# Patient Record
Sex: MALE | Race: WHITE | Employment: FULL TIME | URBAN - METROPOLITAN AREA
[De-identification: names, ages, dates, MRNs, and addresses within clinical notes are randomized per-mention and may not be internally consistent; named-entity substitution may affect disease eponyms.]

---

## 2017-03-06 ENCOUNTER — OFFICE VISIT (OUTPATIENT)
Dept: FAMILY MEDICINE CLINIC | Facility: CLINIC | Age: 32
End: 2017-03-06

## 2017-03-06 ENCOUNTER — APPOINTMENT (OUTPATIENT)
Dept: LAB | Age: 32
End: 2017-03-06
Attending: FAMILY MEDICINE
Payer: COMMERCIAL

## 2017-03-06 VITALS
HEART RATE: 84 BPM | BODY MASS INDEX: 41.22 KG/M2 | RESPIRATION RATE: 18 BRPM | WEIGHT: 272 LBS | TEMPERATURE: 98 F | HEIGHT: 68 IN | SYSTOLIC BLOOD PRESSURE: 127 MMHG | DIASTOLIC BLOOD PRESSURE: 82 MMHG

## 2017-03-06 DIAGNOSIS — Z00.00 ROUTINE PHYSICAL EXAMINATION: ICD-10-CM

## 2017-03-06 LAB
ALBUMIN SERPL BCP-MCNC: 4 G/DL (ref 3.5–4.8)
ALBUMIN/GLOB SERPL: 1.2 {RATIO} (ref 1–2)
ALP SERPL-CCNC: 73 U/L (ref 32–100)
ALT SERPL-CCNC: 80 U/L (ref 17–63)
ANION GAP SERPL CALC-SCNC: 9 MMOL/L (ref 0–18)
AST SERPL-CCNC: 53 U/L (ref 15–41)
BILIRUB SERPL-MCNC: 0.7 MG/DL (ref 0.3–1.2)
BUN SERPL-MCNC: 9 MG/DL (ref 8–20)
BUN/CREAT SERPL: 10.6 (ref 10–20)
CALCIUM SERPL-MCNC: 9.2 MG/DL (ref 8.5–10.5)
CHLORIDE SERPL-SCNC: 104 MMOL/L (ref 95–110)
CHOLEST SERPL-MCNC: 155 MG/DL (ref 110–200)
CO2 SERPL-SCNC: 27 MMOL/L (ref 22–32)
CREAT SERPL-MCNC: 0.85 MG/DL (ref 0.5–1.5)
ERYTHROCYTE [DISTWIDTH] IN BLOOD BY AUTOMATED COUNT: 16.3 % (ref 11–15)
GLOBULIN PLAS-MCNC: 3.3 G/DL (ref 2.5–3.7)
GLUCOSE SERPL-MCNC: 74 MG/DL (ref 70–99)
HCT VFR BLD AUTO: 42.5 % (ref 41–52)
HDLC SERPL-MCNC: 30 MG/DL
HGB BLD-MCNC: 13.5 G/DL (ref 13.5–17.5)
LDLC SERPL CALC-MCNC: 68 MG/DL (ref 0–99)
MCH RBC QN AUTO: 19.2 PG (ref 27–32)
MCHC RBC AUTO-ENTMCNC: 31.8 G/DL (ref 32–37)
MCV RBC AUTO: 60.3 FL (ref 80–100)
NONHDLC SERPL-MCNC: 125 MG/DL
OSMOLALITY UR CALC.SUM OF ELEC: 287 MOSM/KG (ref 275–295)
PLATELET # BLD AUTO: 243 K/UL (ref 140–400)
PMV BLD AUTO: 9.1 FL (ref 7.4–10.3)
POTASSIUM SERPL-SCNC: 3.8 MMOL/L (ref 3.3–5.1)
PROT SERPL-MCNC: 7.3 G/DL (ref 5.9–8.4)
RBC # BLD AUTO: 7.04 M/UL (ref 4.5–5.9)
SODIUM SERPL-SCNC: 140 MMOL/L (ref 136–144)
TRIGL SERPL-MCNC: 283 MG/DL (ref 1–149)
TSH SERPL-ACNC: 4.38 UIU/ML (ref 0.34–5.6)
WBC # BLD AUTO: 6.1 K/UL (ref 4–11)

## 2017-03-06 PROCEDURE — 80061 LIPID PANEL: CPT

## 2017-03-06 PROCEDURE — 36415 COLL VENOUS BLD VENIPUNCTURE: CPT

## 2017-03-06 PROCEDURE — 84402 ASSAY OF FREE TESTOSTERONE: CPT

## 2017-03-06 PROCEDURE — 99395 PREV VISIT EST AGE 18-39: CPT | Performed by: FAMILY MEDICINE

## 2017-03-06 PROCEDURE — 80053 COMPREHEN METABOLIC PANEL: CPT

## 2017-03-06 PROCEDURE — 85027 COMPLETE CBC AUTOMATED: CPT

## 2017-03-06 PROCEDURE — 84443 ASSAY THYROID STIM HORMONE: CPT

## 2017-03-06 PROCEDURE — 84403 ASSAY OF TOTAL TESTOSTERONE: CPT

## 2017-03-06 NOTE — PROGRESS NOTES
HPI:    Patient ID: Charli Gongora is a 27year old male. HPI Comments: Patient is here for routine physical exam. No acute issues. No significant chronic medical problems. Patient is requesting blood testing. Diet and exercise have been fair.  Past and affect. His behavior is normal. Judgment and thought content normal.   Vitals reviewed. ASSESSMENT/PLAN:   Routine physical examination with some ED issues:  - Exam is unremarkable.  Screening tests were discussed, and after discussion, will

## 2017-03-08 ENCOUNTER — TELEPHONE (OUTPATIENT)
Dept: FAMILY MEDICINE CLINIC | Facility: CLINIC | Age: 32
End: 2017-03-08

## 2017-03-08 LAB
TESTOSTERONE, FREE, S: 8.75 NG/DL
TESTOSTERONE, TOTAL, S: 265 NG/DL

## 2017-03-08 RX ORDER — SILDENAFIL 50 MG/1
50 TABLET, FILM COATED ORAL
Qty: 10 TABLET | Refills: 2 | Status: SHIPPED | OUTPATIENT
Start: 2017-03-08 | End: 2022-02-01

## 2017-03-09 ENCOUNTER — TELEPHONE (OUTPATIENT)
Dept: FAMILY MEDICINE CLINIC | Facility: CLINIC | Age: 32
End: 2017-03-09

## 2017-03-10 NOTE — TELEPHONE ENCOUNTER
PA for Viagra 50 mg tab completed with [employer] via CMM response time 3-5 business days [facility].

## 2017-03-16 NOTE — TELEPHONE ENCOUNTER
Viagra 50 mg tab #8/30 days approved effective 3/12/2017-3/12/2018 case# [id number]. Patient notified via [address]

## 2017-11-14 ENCOUNTER — HOSPITAL ENCOUNTER (EMERGENCY)
Facility: HOSPITAL | Age: 32
Discharge: HOME | End: 2017-11-15
Attending: EMERGENCY MEDICINE
Payer: COMMERCIAL

## 2017-11-14 DIAGNOSIS — S91.112A LACERATION OF LEFT GREAT TOE WITHOUT FOREIGN BODY PRESENT OR DAMAGE TO NAIL, INITIAL ENCOUNTER: Primary | ICD-10-CM

## 2017-11-14 PROCEDURE — 99283 EMERGENCY DEPT VISIT LOW MDM: CPT

## 2017-11-14 PROCEDURE — 12001 RPR S/N/AX/GEN/TRNK 2.5CM/<: CPT

## 2017-11-15 ENCOUNTER — APPOINTMENT (OUTPATIENT)
Dept: GENERAL RADIOLOGY | Facility: HOSPITAL | Age: 32
End: 2017-11-15
Attending: EMERGENCY MEDICINE
Payer: COMMERCIAL

## 2017-11-15 VITALS
HEART RATE: 111 BPM | WEIGHT: 250 LBS | HEIGHT: 68 IN | TEMPERATURE: 99 F | BODY MASS INDEX: 37.89 KG/M2 | RESPIRATION RATE: 16 BRPM | SYSTOLIC BLOOD PRESSURE: 164 MMHG | DIASTOLIC BLOOD PRESSURE: 85 MMHG | OXYGEN SATURATION: 97 %

## 2017-11-15 PROCEDURE — 73660 X-RAY EXAM OF TOE(S): CPT | Performed by: EMERGENCY MEDICINE

## 2017-11-15 NOTE — ED PROVIDER NOTES
Patient Seen in: [facility] AND [facility] Emergency Department    History   Patient presents with:  Laceration Abrasion (integumentary)    Stated Complaint: cut on toe    HPI    Patient presents to the emergency room complaining of a laceration to his left grea great toe  Laceration length (cm):  2cm  Foreign bodies:  None  Tendon involvement:  None  Nerve involvement:  None  Vascular damage?   None    Anesthesia:   Local anesthetic:  Lidocaine 1%    Procedure:  Patient was prepped and draped in usual sterile fash

## 2017-11-15 NOTE — ED NOTES
Patient resting in bed, call light at reach. Patient complains of left great toe pain 3/10, declines pain medication at this time.

## 2017-11-21 ENCOUNTER — OFFICE VISIT (OUTPATIENT)
Dept: FAMILY MEDICINE CLINIC | Facility: CLINIC | Age: 32
End: 2017-11-21

## 2017-11-21 VITALS
RESPIRATION RATE: 20 BRPM | SYSTOLIC BLOOD PRESSURE: 129 MMHG | HEART RATE: 92 BPM | TEMPERATURE: 98 F | WEIGHT: 243 LBS | DIASTOLIC BLOOD PRESSURE: 85 MMHG | BODY MASS INDEX: 38.14 KG/M2 | HEIGHT: 67 IN

## 2017-11-21 DIAGNOSIS — S91.112D LACERATION OF LEFT GREAT TOE WITHOUT FOREIGN BODY PRESENT OR DAMAGE TO NAIL, SUBSEQUENT ENCOUNTER: ICD-10-CM

## 2017-11-21 PROCEDURE — 99213 OFFICE O/P EST LOW 20 MIN: CPT | Performed by: FAMILY MEDICINE

## 2017-11-21 PROCEDURE — 99212 OFFICE O/P EST SF 10 MIN: CPT | Performed by: FAMILY MEDICINE

## 2017-11-21 NOTE — PROGRESS NOTES
HPI:    Patient ID: Ellen Qureshi is a 34year old male. Pt presents for follow up from the ER for toe laceration and repair of left big toe. Pt had injury while playing floor hockey. Patient is here for suture removal. Patient states no sig pains. above.       Physical Exam  ED Triage Vitals (11/14/17 2312)  BP: 140/74  Pulse: 123  Resp: 16  Temp: 98.7 °F (37.1 °C)  Temp src: n/a  SpO2: 98 %  O2 Device: n/a     Current:BP (!) 164/85   Pulse 111   Temp 98.7 °F (37.1 °C)   Resp 16   Ht 172.7 cm (5' 8\ Physical Exam   Constitutional: He appears well-developed and well-nourished.    Musculoskeletal:   Left big toe: wound healing well:    3 Sutures removed without problems; area bandaged and treated with neosporin               ASSESSMENT/PLAN:   Casey Alejandro

## 2018-11-14 ENCOUNTER — OFFICE VISIT (OUTPATIENT)
Dept: FAMILY MEDICINE CLINIC | Facility: CLINIC | Age: 33
End: 2018-11-14
Payer: COMMERCIAL

## 2018-11-14 VITALS
RESPIRATION RATE: 20 BRPM | SYSTOLIC BLOOD PRESSURE: 136 MMHG | HEART RATE: 97 BPM | HEIGHT: 67 IN | TEMPERATURE: 99 F | BODY MASS INDEX: 38.77 KG/M2 | WEIGHT: 247 LBS | DIASTOLIC BLOOD PRESSURE: 85 MMHG

## 2018-11-14 DIAGNOSIS — S89.91XA INJURY OF RIGHT KNEE, INITIAL ENCOUNTER: Primary | ICD-10-CM

## 2018-11-14 PROCEDURE — 99212 OFFICE O/P EST SF 10 MIN: CPT | Performed by: FAMILY MEDICINE

## 2018-11-14 PROCEDURE — 99213 OFFICE O/P EST LOW 20 MIN: CPT | Performed by: FAMILY MEDICINE

## 2018-11-14 NOTE — PROGRESS NOTES
HPI:    Patient ID: Gavin Perez is a 57 Ger[address]ety[address] old male. Pt presents with pain and injury to the right knee after playing floor hockey. Pt stepped awkwardly and knee buckled. Pt has had some swelling of knee.  Pt can bear weight but does have some d

## 2018-12-05 NOTE — ED NOTES
Telephone Encounter by Anette Arnett at 10/19/17 11:18 AM     Author:  Anette Arnett Service:  (none) Author Type:  Technician     Filed:  10/19/17 11:19 AM Encounter Date:  10/10/2017 Status:  Signed     :  Anette Arnett (Technician)            Patient was contacted and appointment for tomorrow 10/20 @ 9 am was scheduled  [place] Salt was contacted and will be here for the appointment[LR1.1M]      Revision History        User Key Date/Time User Provider Type Action    > LR1.1 10/19/17 11:19 AM Anette Arnett Technician Sign    M - Manual Wound dressing and boot applied to patient by ER tech per MD order.

## 2022-02-01 ENCOUNTER — TELEPHONE (OUTPATIENT)
Dept: FAMILY MEDICINE CLINIC | Facility: CLINIC | Age: 37
End: 2022-02-01

## 2022-02-01 ENCOUNTER — OFFICE VISIT (OUTPATIENT)
Dept: FAMILY MEDICINE CLINIC | Facility: CLINIC | Age: 37
End: 2022-02-01
Payer: COMMERCIAL

## 2022-02-01 VITALS
RESPIRATION RATE: 20 BRPM | DIASTOLIC BLOOD PRESSURE: 92 MMHG | TEMPERATURE: 98 F | SYSTOLIC BLOOD PRESSURE: 148 MMHG | BODY MASS INDEX: 37.2 KG/M2 | WEIGHT: 237 LBS | HEART RATE: 96 BPM | HEIGHT: 67 IN

## 2022-02-01 DIAGNOSIS — N52.9 ERECTILE DYSFUNCTION, UNSPECIFIED ERECTILE DYSFUNCTION TYPE: ICD-10-CM

## 2022-02-01 DIAGNOSIS — R07.89 RIGHT-SIDED CHEST WALL PAIN: Primary | ICD-10-CM

## 2022-02-01 PROCEDURE — 3080F DIAST BP >= 90 MM HG: CPT | Performed by: FAMILY MEDICINE

## 2022-02-01 PROCEDURE — 99213 OFFICE O/P EST LOW 20 MIN: CPT | Performed by: FAMILY MEDICINE

## 2022-02-01 PROCEDURE — 3008F BODY MASS INDEX DOCD: CPT | Performed by: FAMILY MEDICINE

## 2022-02-01 PROCEDURE — 3077F SYST BP >= 140 MM HG: CPT | Performed by: FAMILY MEDICINE

## 2022-02-01 RX ORDER — SILDENAFIL 50 MG/1
50 TABLET, FILM COATED ORAL
Qty: 10 TABLET | Refills: 5 | Status: SHIPPED | OUTPATIENT
Start: 2022-02-01 | End: 2022-03-03

## 2022-02-02 NOTE — TELEPHONE ENCOUNTER
Prior authorization form has been filled out for sildenafil and faxed to [employer] to [phone] and [phone].  It can take 1-5 business days for a decision to come back none

## 2022-02-02 NOTE — TELEPHONE ENCOUNTER
Message noted. Can inform patient and can pay out of pocket or make follow up to discuss alternative treatment.

## 2022-02-03 NOTE — TELEPHONE ENCOUNTER
Called patient and relayed message regarding medication denial. Patient states that he will schedule appointment through [facility] to discuss alternate treatment.

## 2022-05-24 ENCOUNTER — LAB ENCOUNTER (OUTPATIENT)
Dept: LAB | Age: 37
End: 2022-05-24
Attending: FAMILY MEDICINE
Payer: COMMERCIAL

## 2022-05-24 ENCOUNTER — OFFICE VISIT (OUTPATIENT)
Dept: FAMILY MEDICINE CLINIC | Facility: CLINIC | Age: 37
End: 2022-05-24
Payer: COMMERCIAL

## 2022-05-24 VITALS
TEMPERATURE: 98 F | WEIGHT: 233 LBS | RESPIRATION RATE: 16 BRPM | BODY MASS INDEX: 36.57 KG/M2 | DIASTOLIC BLOOD PRESSURE: 97 MMHG | HEIGHT: 67 IN | HEART RATE: 103 BPM | SYSTOLIC BLOOD PRESSURE: 152 MMHG

## 2022-05-24 DIAGNOSIS — Z00.00 ROUTINE PHYSICAL EXAMINATION: Primary | ICD-10-CM

## 2022-05-24 DIAGNOSIS — Z00.00 ROUTINE PHYSICAL EXAMINATION: ICD-10-CM

## 2022-05-24 DIAGNOSIS — G47.30 SLEEP APNEA, UNSPECIFIED TYPE: ICD-10-CM

## 2022-05-24 DIAGNOSIS — N52.9 ERECTILE DYSFUNCTION, UNSPECIFIED ERECTILE DYSFUNCTION TYPE: ICD-10-CM

## 2022-05-24 DIAGNOSIS — F17.200 SMOKING: ICD-10-CM

## 2022-05-24 LAB
ALBUMIN SERPL-MCNC: 3.8 G/DL (ref 3.4–5)
ALBUMIN/GLOB SERPL: 0.9 {RATIO} (ref 1–2)
ALP LIVER SERPL-CCNC: 85 U/L
ALT SERPL-CCNC: 73 U/L
ANION GAP SERPL CALC-SCNC: 4 MMOL/L (ref 0–18)
AST SERPL-CCNC: 65 U/L (ref 15–37)
BILIRUB SERPL-MCNC: 1.3 MG/DL (ref 0.1–2)
BUN BLD-MCNC: 9 MG/DL (ref 7–18)
BUN/CREAT SERPL: 11.7 (ref 10–20)
CALCIUM BLD-MCNC: 9.7 MG/DL (ref 8.5–10.1)
CHLORIDE SERPL-SCNC: 105 MMOL/L (ref 98–112)
CHOLEST SERPL-MCNC: 129 MG/DL (ref ?–200)
CO2 SERPL-SCNC: 29 MMOL/L (ref 21–32)
CREAT BLD-MCNC: 0.77 MG/DL
DEPRECATED RDW RBC AUTO: 38.2 FL (ref 35.1–46.3)
ERYTHROCYTE [DISTWIDTH] IN BLOOD BY AUTOMATED COUNT: 18.6 % (ref 11–15)
FASTING PATIENT LIPID ANSWER: YES
FASTING STATUS PATIENT QL REPORTED: YES
GLOBULIN PLAS-MCNC: 4.4 G/DL (ref 2.8–4.4)
GLUCOSE BLD-MCNC: 127 MG/DL (ref 70–99)
HCT VFR BLD AUTO: 45.2 %
HDLC SERPL-MCNC: 28 MG/DL (ref 40–59)
HGB BLD-MCNC: 14.2 G/DL
LDLC SERPL CALC-MCNC: 72 MG/DL (ref ?–100)
MCH RBC QN AUTO: 20.8 PG (ref 26–34)
MCHC RBC AUTO-ENTMCNC: 31.4 G/DL (ref 31–37)
MCV RBC AUTO: 66.3 FL
NONHDLC SERPL-MCNC: 101 MG/DL (ref ?–130)
OSMOLALITY SERPL CALC.SUM OF ELEC: 286 MOSM/KG (ref 275–295)
PLATELET # BLD AUTO: 210 10(3)UL (ref 150–450)
POTASSIUM SERPL-SCNC: 4.1 MMOL/L (ref 3.5–5.1)
PROT SERPL-MCNC: 8.2 G/DL (ref 6.4–8.2)
RBC # BLD AUTO: 6.82 X10(6)UL
SODIUM SERPL-SCNC: 138 MMOL/L (ref 136–145)
TRIGL SERPL-MCNC: 171 MG/DL (ref 30–149)
VLDLC SERPL CALC-MCNC: 26 MG/DL (ref 0–30)
WBC # BLD AUTO: 7.9 X10(3) UL (ref 4–11)

## 2022-05-24 PROCEDURE — 3077F SYST BP >= 140 MM HG: CPT | Performed by: FAMILY MEDICINE

## 2022-05-24 PROCEDURE — 36415 COLL VENOUS BLD VENIPUNCTURE: CPT

## 2022-05-24 PROCEDURE — 80053 COMPREHEN METABOLIC PANEL: CPT

## 2022-05-24 PROCEDURE — 85060 BLOOD SMEAR INTERPRETATION: CPT

## 2022-05-24 PROCEDURE — 99395 PREV VISIT EST AGE 18-39: CPT | Performed by: FAMILY MEDICINE

## 2022-05-24 PROCEDURE — 3080F DIAST BP >= 90 MM HG: CPT | Performed by: FAMILY MEDICINE

## 2022-05-24 PROCEDURE — 80061 LIPID PANEL: CPT

## 2022-05-24 PROCEDURE — 85027 COMPLETE CBC AUTOMATED: CPT

## 2022-05-24 PROCEDURE — 3008F BODY MASS INDEX DOCD: CPT | Performed by: FAMILY MEDICINE

## 2022-05-24 NOTE — PROGRESS NOTES
Subjective:   Patient ID: Di Reaves is a 39year old male. Patient is here for routine physical exam. No acute issues. No significant chronic medical problems. Patient is requesting blood testing. Diet and exercise have been fair. Past medical history, family history, and social history were reviewed. Pt has hx of ED and was told that insurance no longer covers viagra. Pt also has had hx of smoking and had some right lower chest discomfort with cough. This resolved and did not do x-ray at time but symptoms have recurred. Pt also would like to have evaluation of sleep apnea as he has hx of apnea and snoring. History/Other:   Review of Systems   Constitutional: Negative. Negative for fever. HENT: Negative. Eyes: Negative. Respiratory: Positive for apnea and cough. Negative for shortness of breath. Cardiovascular: Negative. Negative for chest pain and palpitations. Gastrointestinal: Negative. Negative for abdominal pain, blood in stool and constipation. Genitourinary: Negative. Negative for difficulty urinating and dysuria. Musculoskeletal: Negative. Skin: Negative. Neurological: Negative. Negative for dizziness and headaches. Psychiatric/Behavioral: Negative. Negative for dysphoric mood. The patient is not nervous/anxious. No current outpatient medications on file. Allergies:No Known Allergies    Objective:   Physical Exam  Constitutional:       Appearance: Normal appearance. He is well-developed. HENT:      Head: Normocephalic. Right Ear: Tympanic membrane, ear canal and external ear normal.      Left Ear: Tympanic membrane, ear canal and external ear normal.      Nose: Nose normal.      Mouth/Throat:      Mouth: Mucous membranes are moist.      Pharynx: No oropharyngeal exudate or posterior oropharyngeal erythema. Eyes:      Conjunctiva/sclera: Conjunctivae normal.   Cardiovascular:      Rate and Rhythm: Normal rate and regular rhythm. Pulses: Normal pulses. Heart sounds: Normal heart sounds. Pulmonary:      Effort: Pulmonary effort is normal. No respiratory distress. Breath sounds: Normal breath sounds. No wheezing or rales. Abdominal:      General: Abdomen is flat. There is no distension. Palpations: Abdomen is soft. Tenderness: There is no abdominal tenderness. Musculoskeletal:         General: Normal range of motion. Cervical back: Normal range of motion and neck supple. Skin:     General: Skin is warm. Neurological:      General: No focal deficit present. Mental Status: He is alert and oriented to person, place, and time. Sensory: No sensory deficit. Deep Tendon Reflexes: Reflexes are normal and symmetric. Reflexes normal.   Psychiatric:         Mood and Affect: Mood normal.         Behavior: Behavior normal.         Assessment & Plan:   Routine physical examination:  - Exam is unremarkable. Screening tests were discussed, and after discussion, will check lab work as below. Healthy diet, exercise, and weight were discussed. To call if problems and follow up and further management after testing. Routine follow up. Erectile dysfunction, unspecified erectile dysfunction type:  - Stable: medication reviewed and  Pt will check with pharmacy to see if any ED medications are covered; To call if problems; Routine follow up. Sleep apnea, unspecified type:  - After discussion, will check sleep study: Follow up and further management after testing with sleep specialist.    Smoking: cough  - After discussion with patient, will check chest xray. Follow up and further management after testing. Smoking cessation encouraged. Orders Placed This Encounter      Lipid Panel      Comp Metabolic Panel (14)      CBC, Platelet;  No Differential      Meds This Visit:  Requested Prescriptions      No prescriptions requested or ordered in this encounter       Imaging & Referrals:   [facility] REFERRAL HOME SLEEP APNEA TEST  XR CHEST PA + LAT CHEST (CPT=71046)

## 2023-08-19 ENCOUNTER — HOSPITAL ENCOUNTER (OUTPATIENT)
Age: 38
Discharge: HOME | End: 2023-08-19
Payer: COMMERCIAL

## 2023-08-19 ENCOUNTER — APPOINTMENT (OUTPATIENT)
Dept: GENERAL RADIOLOGY | Age: 38
End: 2023-08-19
Attending: NURSE PRACTITIONER
Payer: COMMERCIAL

## 2023-08-19 VITALS
SYSTOLIC BLOOD PRESSURE: 160 MMHG | HEART RATE: 103 BPM | TEMPERATURE: 98 F | RESPIRATION RATE: 18 BRPM | DIASTOLIC BLOOD PRESSURE: 90 MMHG | OXYGEN SATURATION: 96 %

## 2023-08-19 DIAGNOSIS — M25.472 PAIN AND SWELLING OF ANKLE, LEFT: Primary | ICD-10-CM

## 2023-08-19 DIAGNOSIS — M25.572 PAIN AND SWELLING OF ANKLE, LEFT: Primary | ICD-10-CM

## 2023-08-19 PROCEDURE — 73610 X-RAY EXAM OF ANKLE: CPT | Performed by: NURSE PRACTITIONER

## 2023-08-19 PROCEDURE — 99213 OFFICE O/P EST LOW 20 MIN: CPT | Performed by: NURSE PRACTITIONER

## 2023-08-19 RX ORDER — PREDNISONE 20 MG/1
40 TABLET ORAL DAILY
Qty: 10 TABLET | Refills: 0 | Status: SHIPPED | OUTPATIENT
Start: 2023-08-19 | End: 2023-08-24

## 2023-08-19 RX ORDER — CEFADROXIL 500 MG/1
500 CAPSULE ORAL 2 TIMES DAILY
Qty: 20 CAPSULE | Refills: 0 | Status: SHIPPED | OUTPATIENT
Start: 2023-08-19 | End: 2023-08-29

## 2023-08-19 NOTE — DISCHARGE INSTRUCTIONS
Follow-up with your primary care provider in 2 days for a skin recheck. Start the prednisone and the antibiotic as prescribed take a probiotic or eat yogurt as some antibiotics cause upset stomach and diarrhea. You were provided with education for cellulitis as there is concern for cellulitis but also concern for gout. Take over-the-counter ibuprofen as needed for pain you may apply a cool compress to the area inside of a pillowcase or cloth do not apply ice directly to the skin. If you develop increase in swelling worsening pain redness is moving up the leg toward the calf or you develop any calf pain swelling redness or warmth or inability to ambulate go to the nearest emergency department.

## 2024-03-20 ENCOUNTER — OFFICE VISIT (OUTPATIENT)
Dept: FAMILY MEDICINE CLINIC | Facility: CLINIC | Age: 39
End: 2024-03-20
Payer: COMMERCIAL

## 2024-03-20 VITALS
DIASTOLIC BLOOD PRESSURE: 86 MMHG | HEIGHT: 66.6 IN | HEART RATE: 109 BPM | SYSTOLIC BLOOD PRESSURE: 135 MMHG | TEMPERATURE: 98 F | RESPIRATION RATE: 20 BRPM | BODY MASS INDEX: 38.59 KG/M2 | WEIGHT: 243 LBS

## 2024-03-20 DIAGNOSIS — R21 RASH AND NONSPECIFIC SKIN ERUPTION: Primary | ICD-10-CM

## 2024-03-20 DIAGNOSIS — R22.2 LUMP IN CHEST: ICD-10-CM

## 2024-03-20 DIAGNOSIS — G47.30 SLEEP DISORDER BREATHING: ICD-10-CM

## 2024-03-20 PROCEDURE — 3079F DIAST BP 80-89 MM HG: CPT | Performed by: FAMILY MEDICINE

## 2024-03-20 PROCEDURE — 3008F BODY MASS INDEX DOCD: CPT | Performed by: FAMILY MEDICINE

## 2024-03-20 PROCEDURE — 3075F SYST BP GE 130 - 139MM HG: CPT | Performed by: FAMILY MEDICINE

## 2024-03-20 PROCEDURE — 99214 OFFICE O/P EST MOD 30 MIN: CPT | Performed by: FAMILY MEDICINE

## 2025-05-09 ENCOUNTER — OFFICE VISIT (OUTPATIENT)
Dept: FAMILY MEDICINE CLINIC | Facility: CLINIC | Age: 40
End: 2025-05-09
Payer: COMMERCIAL

## 2025-05-09 VITALS
DIASTOLIC BLOOD PRESSURE: 84 MMHG | WEIGHT: 250 LBS | BODY MASS INDEX: 39.7 KG/M2 | HEART RATE: 100 BPM | TEMPERATURE: 99 F | SYSTOLIC BLOOD PRESSURE: 160 MMHG | RESPIRATION RATE: 18 BRPM | OXYGEN SATURATION: 96 % | HEIGHT: 66.6 IN

## 2025-05-09 DIAGNOSIS — L03.115 CELLULITIS OF RIGHT LOWER EXTREMITY: Primary | ICD-10-CM

## 2025-05-09 DIAGNOSIS — R03.0 ELEVATED BLOOD PRESSURE READING IN OFFICE WITHOUT DIAGNOSIS OF HYPERTENSION: ICD-10-CM

## 2025-05-09 PROCEDURE — 3008F BODY MASS INDEX DOCD: CPT | Performed by: NURSE PRACTITIONER

## 2025-05-09 PROCEDURE — 3079F DIAST BP 80-89 MM HG: CPT | Performed by: NURSE PRACTITIONER

## 2025-05-09 PROCEDURE — 3077F SYST BP >= 140 MM HG: CPT | Performed by: NURSE PRACTITIONER

## 2025-05-09 PROCEDURE — 99213 OFFICE O/P EST LOW 20 MIN: CPT | Performed by: NURSE PRACTITIONER

## 2025-05-09 RX ORDER — CEFADROXIL 500 MG/1
500 CAPSULE ORAL 2 TIMES DAILY
Qty: 14 CAPSULE | Refills: 0 | Status: SHIPPED | OUTPATIENT
Start: 2025-05-09 | End: 2025-05-16

## 2025-05-09 RX ORDER — MUPIROCIN 20 MG/G
OINTMENT TOPICAL
Qty: 22 G | Refills: 0 | Status: SHIPPED | OUTPATIENT
Start: 2025-05-09

## 2025-05-09 NOTE — PROGRESS NOTES
CHIEF COMPLAINT:     Chief Complaint   Patient presents with    Leg or Foot Injury     Sx Sunday - Noticed a scrape on R shin  Sx Tues/Wed - Redness, slight swelling around scrape, warmth around the area  Denies numbness/tingling, difficulty bearing weight, known injury, discharge, itchiness  OTC Neosporin       HPI:     Rodolfo Inman is a 39 year old male who presents with concerns of possible skin infection to right leg.  Onset 2-3 days ago.  He has no idea how the scrape occurred but notes it is increasing in redness and warmth.  No drainage.  No calf pain or edema.  Denies fever or systemic symptoms.  Ambulating normally, denies numbness or tingling.  Has applied otc neosporin.  Denies known DM but sugars borderline high at last physical a couple years ago.  Prior Hx of poor wound healing or MRSA: Denies    Current Medications[1]   Past Medical History[2]   Social History:  Short Social Hx on File[3]     REVIEW OF SYSTEMS:   GENERAL: feels well otherwise, no fever, no chills.  SKIN: as above.  CHEST: no chest pains, no palpitations.  LUNGS: denies shortness of breath with exertion or rest. No wheezing, no cough.  LYMPH: No enlargement of the lymph nodes.  MUSC/SKEL: no joint swelling, no joint stiffness.  CARDIOVASCULAR: denies chest pain, dyspnea, or SOB  GI: no nausea, no vomiting or abdominal pain  NEURO: no abnormal sensation, no tingling of the skin or numbness.    EXAM:   /84   Pulse 100   Temp 99.1 °F (37.3 °C) (Tympanic)   Resp 18   Ht 5' 6.6\" (1.692 m)   Wt 250 lb (113.4 kg)   SpO2 96%   BMI 39.63 kg/m²   GENERAL: Well-appearing, well developed, well nourished, and in no apparent distress  SKIN: See wound in picture.  Extending erythema is tender, warm.  No streaking from wound, no discharge expresses.  LUNGS: clear to auscultation bilaterally, no wheezes or rhonchi. Breathing is non labored.  CARDIO: RRR without murmur  EXTREMITIES: no cyanosis, clubbing or edema.  Cap refill brisk-  less than 2 seconds.   LYMPH: No lymphadenopathy.                ASSESSMENT AND PLAN:     ASSESSMENT:   Encounter Diagnoses   Name Primary?    Cellulitis of right lower extremity Yes    Elevated blood pressure reading in office without diagnosis of hypertension        PLAN:   - Medication as below.  - Cleanse gently with mild soapy water or wound spray.    - Skin care discussed with patient.   - Instructions and Comfort Care as listed in Patient Instructions.    - Advised BP elevated.  Schedule with PCP for re-check within 2 weeks, also overdue for labs/physical.  - The patient was advised to follow up within 2-3 days if no improvement/worsening.  Advised ER if ever new systemic symptoms/fever or streaking from wound.  - The patient indicates understanding of these issues and agrees to the plan.    Requested Prescriptions     Signed Prescriptions Disp Refills    cefadroxil 500 MG Oral Cap 14 capsule 0     Sig: Take 1 capsule (500 mg total) by mouth 2 (two) times daily for 7 days.    mupirocin 2 % External Ointment 22 g 0     Sig: Apply to affected area BID for 5-7 days     Medication side effects/instructions reviewed.  Pt verbalizes understanding.  There are no Patient Instructions on file for this visit.                    [1]   Current Outpatient Medications   Medication Sig Dispense Refill    cefadroxil 500 MG Oral Cap Take 1 capsule (500 mg total) by mouth 2 (two) times daily for 7 days. 14 capsule 0    mupirocin 2 % External Ointment Apply to affected area BID for 5-7 days 22 g 0   [2] History reviewed. No pertinent past medical history.  [3]   Social History  Socioeconomic History    Marital status:    Tobacco Use    Smoking status: Every Day     Current packs/day: 0.00     Types: Cigarettes     Last attempt to quit: 2017     Years since quittin.3    Smokeless tobacco: Current   Vaping Use    Vaping status: Never Used   Substance and Sexual Activity    Alcohol use: Yes     Alcohol/week: 10.0  standard drinks of alcohol     Types: 10 Cans of beer per week     Comment: 10 beers, weekly.    Drug use: No   Other Topics Concern    Caffeine Concern Yes     Comment: Soda

## 2025-05-12 ENCOUNTER — HOSPITAL ENCOUNTER (OUTPATIENT)
Age: 40
Discharge: HOME | End: 2025-05-12
Payer: COMMERCIAL

## 2025-05-12 ENCOUNTER — APPOINTMENT (OUTPATIENT)
Dept: ULTRASOUND IMAGING | Age: 40
End: 2025-05-12
Attending: NURSE PRACTITIONER
Payer: COMMERCIAL

## 2025-05-12 VITALS
DIASTOLIC BLOOD PRESSURE: 84 MMHG | TEMPERATURE: 99 F | SYSTOLIC BLOOD PRESSURE: 143 MMHG | HEART RATE: 92 BPM | OXYGEN SATURATION: 97 % | RESPIRATION RATE: 18 BRPM

## 2025-05-12 DIAGNOSIS — L03.115 CELLULITIS OF RIGHT LOWER EXTREMITY: Primary | ICD-10-CM

## 2025-05-12 LAB
#MXD IC: 0.8 X10ˆ3/UL (ref 0.1–1)
BUN BLD-MCNC: 5 MG/DL (ref 7–18)
CHLORIDE BLD-SCNC: 98 MMOL/L (ref 98–112)
CO2 BLD-SCNC: 26 MMOL/L (ref 21–32)
CREAT BLD-MCNC: 0.7 MG/DL (ref 0.7–1.3)
EGFRCR SERPLBLD CKD-EPI 2021: 119 ML/MIN/1.73M2 (ref 60–?)
GLUCOSE BLD-MCNC: 177 MG/DL (ref 70–99)
HCT VFR BLD AUTO: 41.3 % (ref 39–53)
HCT VFR BLD CALC: 45 % (ref 37–53)
HGB BLD-MCNC: 12.5 G/DL (ref 13–17.5)
ISTAT IONIZED CALCIUM FOR CHEM 8: 1.19 MMOL/L (ref 1.12–1.32)
LYMPHOCYTES # BLD AUTO: 2.6 X10ˆ3/UL (ref 1–4)
LYMPHOCYTES NFR BLD AUTO: 37.3 %
MCH RBC QN AUTO: 20.6 PG (ref 26–34)
MCHC RBC AUTO-ENTMCNC: 30.3 G/DL (ref 31–37)
MCV RBC AUTO: 67.9 FL (ref 80–100)
MIXED CELL %: 11.5 %
NEUTROPHILS # BLD AUTO: 3.6 X10ˆ3/UL (ref 1.5–7.7)
NEUTROPHILS NFR BLD AUTO: 51.2 %
POTASSIUM BLD-SCNC: 4.1 MMOL/L (ref 3.6–5.1)
RBC # BLD AUTO: 6.08 X10ˆ6/UL (ref 4.3–5.7)
SODIUM BLD-SCNC: 140 MMOL/L (ref 136–145)
WBC # BLD AUTO: 7 X10ˆ3/UL (ref 4–11)

## 2025-05-12 PROCEDURE — 99215 OFFICE O/P EST HI 40 MIN: CPT

## 2025-05-12 PROCEDURE — 93971 EXTREMITY STUDY: CPT | Performed by: NURSE PRACTITIONER

## 2025-05-12 PROCEDURE — 85025 COMPLETE CBC W/AUTO DIFF WBC: CPT | Performed by: NURSE PRACTITIONER

## 2025-05-12 PROCEDURE — 80047 BASIC METABLC PNL IONIZED CA: CPT

## 2025-05-12 PROCEDURE — 99214 OFFICE O/P EST MOD 30 MIN: CPT

## 2025-05-12 PROCEDURE — 85060 BLOOD SMEAR INTERPRETATION: CPT | Performed by: NURSE PRACTITIONER

## 2025-05-12 PROCEDURE — 36415 COLL VENOUS BLD VENIPUNCTURE: CPT

## 2025-05-12 RX ORDER — DOXYCYCLINE 100 MG/1
100 CAPSULE ORAL 2 TIMES DAILY
Qty: 14 CAPSULE | Refills: 0 | Status: SHIPPED | OUTPATIENT
Start: 2025-05-12 | End: 2025-05-19

## 2025-05-12 NOTE — ED PROVIDER NOTES
Patient Seen in: [facility]      History     Chief Complaint   Patient presents with    Leg or Foot Injury     Wound looks infected, started antibiotics 3 days ago, looks slightly worse. - Entered by patient    Rash Skin Problem     Stated Complaint: Leg or Foot Injury - Wound looks infected, started antibiotics 3 days ago, look*    Subjective:   HPI  History of Present Illness            39-year-old male presents with redness surrounding a scab on his right shin that started on .  No known injury or trauma.  Denies insect sting.  He was seen at an immediate care on  and prescribed Duricef and Bactroban ointment.  He states the area of redness is now spreading.  No drainage from the site.  No fever.  No chills.  He does have swelling to the ankle but no calf pain.  No shortness of breath        Objective:     History reviewed. No pertinent past medical history.           History reviewed. No pertinent surgical history.             Social History     Socioeconomic History    Marital status:    Tobacco Use    Smoking status: Every Day     Current packs/day: 0.00     Types: Cigarettes     Last attempt to quit: 2017     Years since quittin.3    Smokeless tobacco: Current   Vaping Use    Vaping status: Never Used   Substance and Sexual Activity    Alcohol use: Yes     Alcohol/week: 10.0 standard drinks of alcohol     Types: 10 Cans of beer per week     Comment: 10 beers, weekly.    Drug use: No   Other Topics Concern    Caffeine Concern Yes     Comment: Soda              Review of Systems    Positive for stated complaint: Leg or Foot Injury - Wound looks infected, started antibiotics 3 days ago, look*  Other systems are as noted in HPI.  Constitutional and vital signs reviewed.      All other systems reviewed and negative except as noted above.                  Physical Exam     ED Triage Vitals [25 1400]   /84   Pulse 92   Resp 18   Temp 98.8 °F (37.1 °C)   Temp src  Oral   SpO2 97 %   O2 Device None (Room air)       Current Vitals:   Vital Signs  BP: 143/84  Pulse: 92  Resp: 18  Temp: 98.8 °F (37.1 °C)  Temp src: Oral    Oxygen Therapy  SpO2: 97 %  O2 Device: None (Room air)          Physical Exam  Vitals and nursing note reviewed.   Constitutional:       Appearance: Normal appearance.   Cardiovascular:      Rate and Rhythm: Normal rate.      Pulses: Normal pulses.   Pulmonary:      Effort: Pulmonary effort is normal.   Musculoskeletal:         General: Swelling present. Normal range of motion.   Skin:     Findings: Erythema and lesion present.      Comments: Half centimeter small annular scab to the right anterior mid shin with surrounding erythema no drainage no abscess  Swelling noted to the right ankle there is no calf pain   Neurological:      Mental Status: He is alert.                 ED Course     Labs Reviewed   POCT CBC - Abnormal; Notable for the following components:       Result Value    RBC IC 6.08 (*)     HGB IC 12.5 (*)     MCV IC 67.9 (*)     MCH IC 20.6 (*)     MCHC IC 30.3 (*)     All other components within normal limits   POCT ISTAT CHEM8 CARTRIDGE - Abnormal; Notable for the following components:    ISTAT BUN 5 (*)     ISTAT Glucose 177 (*)     All other components within normal limits   CBC W AUTO DIFF          Results                              MDM      Stasis dermatitis, cellulitis, abscess, ulcer rule out diabetes DVT  US neg  Glucose 177  No leukocytosis normal kidney function  Patient currently on Duricef will stop Bactroban and Doxy for cellulitis although considering atypical dermatitis /close PMD dermatology follow-up if symptoms are not improved  All vital signs stable        Medical Decision Making  Problems Addressed:  Cellulitis of right lower extremity: acute illness or injury with systemic symptoms    Amount and/or Complexity of Data Reviewed  Labs: ordered. Decision-making details documented in ED Course.    Risk  OTC drugs.  Prescription  drug management.        Disposition and Plan     Clinical Impression:  1. Cellulitis of right lower extremity         Disposition:  Discharge  5/12/2025  4:46 pm    Follow-up:  Nigel Landaverde MD  [address] [place] [id number] [phone]    Schedule an appointment as soon as possible for a visit   For wound re-check if symptoms worsen go to the ER or dermatology for follow up          Medications Prescribed:  Discharge Medication List as of 5/12/2025  4:49 PM        START taking these medications    Details   doxycycline 100 MG Oral Cap Take 1 capsule (100 mg total) by mouth 2 (two) times daily for 7 days., Normal, Disp-14 capsule, R-0                   Supplementary Documentation:

## 2025-05-12 NOTE — ED INITIAL ASSESSMENT (HPI)
Patient noted an abrasion to his right shin last Sunday.  By Tuesday noted redness around the site.  Was seen at another ic on 5/9 and prescribed cefadroxil and Bactroban ointment.  Reports spreading of the area of redness.  Denies discharge from the site.  Denies fevers, chills.

## 2025-05-12 NOTE — DISCHARGE INSTRUCTIONS
Keep taking the current antibiotic and start  doxycycline as well  Elevate frequently  Monitor glucose level today was 177  Follow-up with your doctor 2 to 3 days for reevaluation for any new or worsening symptoms advise ER for higher level of care

## 2025-05-13 LAB
BASOPHILS # BLD AUTO: 0.05 X10(3) UL (ref 0–0.2)
BASOPHILS NFR BLD AUTO: 0.7 %
DEPRECATED RDW RBC AUTO: 38.1 FL (ref 35.1–46.3)
EOSINOPHIL # BLD AUTO: 0.19 X10(3) UL (ref 0–0.7)
EOSINOPHIL NFR BLD AUTO: 2.7 %
ERYTHROCYTE [DISTWIDTH] IN BLOOD BY AUTOMATED COUNT: 19.3 % (ref 11–15)
HCT VFR BLD AUTO: 39.1 % (ref 39–53)
HGB BLD-MCNC: 12.6 G/DL (ref 13–17.5)
IMM GRANULOCYTES # BLD AUTO: 0.02 X10(3) UL (ref 0–1)
IMM GRANULOCYTES NFR BLD: 0.3 %
LYMPHOCYTES # BLD AUTO: 2.48 X10(3) UL (ref 1–4)
LYMPHOCYTES NFR BLD AUTO: 35.4 %
MCH RBC QN AUTO: 20.4 PG (ref 26–34)
MCHC RBC AUTO-ENTMCNC: 32.2 G/DL (ref 31–37)
MCV RBC AUTO: 63.3 FL (ref 80–100)
MONOCYTES # BLD AUTO: 0.84 X10(3) UL (ref 0.1–1)
MONOCYTES NFR BLD AUTO: 12 %
NEUTROPHILS # BLD AUTO: 3.42 X10 (3) UL (ref 1.5–7.7)
NEUTROPHILS # BLD AUTO: 3.42 X10(3) UL (ref 1.5–7.7)
NEUTROPHILS NFR BLD AUTO: 48.9 %
PLATELET # BLD AUTO: 122 10(3)UL (ref 150–450)
PLATELETS.RETICULATED NFR BLD AUTO: 11.4 % (ref 0–7)
RBC # BLD AUTO: 6.18 X10(6)UL (ref 4.3–5.7)
WBC # BLD AUTO: 7 X10(3) UL (ref 4–11)

## 2025-06-12 ENCOUNTER — OFFICE VISIT (OUTPATIENT)
Dept: INTERNAL MEDICINE CLINIC | Facility: CLINIC | Age: 40
End: 2025-06-12

## 2025-06-12 ENCOUNTER — EKG ENCOUNTER (OUTPATIENT)
Dept: LAB | Age: 40
End: 2025-06-12
Attending: STUDENT IN AN ORGANIZED HEALTH CARE EDUCATION/TRAINING PROGRAM
Payer: COMMERCIAL

## 2025-06-12 VITALS
WEIGHT: 245 LBS | HEIGHT: 66.6 IN | BODY MASS INDEX: 38.91 KG/M2 | DIASTOLIC BLOOD PRESSURE: 83 MMHG | HEART RATE: 96 BPM | SYSTOLIC BLOOD PRESSURE: 136 MMHG

## 2025-06-12 DIAGNOSIS — G47.33 OSA (OBSTRUCTIVE SLEEP APNEA): ICD-10-CM

## 2025-06-12 DIAGNOSIS — E55.9 VITAMIN D DEFICIENCY: ICD-10-CM

## 2025-06-12 DIAGNOSIS — Z82.49 FAMILY HISTORY OF EARLY CAD: ICD-10-CM

## 2025-06-12 DIAGNOSIS — N52.8 OTHER MALE ERECTILE DYSFUNCTION: ICD-10-CM

## 2025-06-12 DIAGNOSIS — M94.269 CHONDROMALACIA OF KNEE, UNSPECIFIED LATERALITY: ICD-10-CM

## 2025-06-12 DIAGNOSIS — Z12.5 SCREENING FOR PROSTATE CANCER: ICD-10-CM

## 2025-06-12 DIAGNOSIS — D56.3 THALASSEMIA TRAIT: ICD-10-CM

## 2025-06-12 DIAGNOSIS — E66.812 CLASS 2 OBESITY: ICD-10-CM

## 2025-06-12 DIAGNOSIS — Z23 IMMUNIZATION DUE: ICD-10-CM

## 2025-06-12 DIAGNOSIS — Z13.6 ENCOUNTER FOR SCREENING FOR CORONARY ARTERY DISEASE: ICD-10-CM

## 2025-06-12 DIAGNOSIS — Z71.6 ENCOUNTER FOR TOBACCO USE CESSATION COUNSELING: ICD-10-CM

## 2025-06-12 DIAGNOSIS — Z00.00 ANNUAL PHYSICAL EXAM: Primary | ICD-10-CM

## 2025-06-12 DIAGNOSIS — Z00.00 ANNUAL PHYSICAL EXAM: ICD-10-CM

## 2025-06-12 LAB
ALBUMIN SERPL-MCNC: 4.2 G/DL (ref 3.2–4.8)
ALBUMIN/GLOB SERPL: 1.1 {RATIO} (ref 1–2)
ALP LIVER SERPL-CCNC: 101 U/L (ref 45–117)
ALT SERPL-CCNC: 43 U/L (ref 10–49)
ANION GAP SERPL CALC-SCNC: 9 MMOL/L (ref 0–18)
AST SERPL-CCNC: 99 U/L (ref ?–34)
ATRIAL RATE: 88 BPM
BASOPHILS # BLD AUTO: 0.04 X10(3) UL (ref 0–0.2)
BASOPHILS NFR BLD AUTO: 0.7 %
BILIRUB SERPL-MCNC: 2.6 MG/DL (ref 0.3–1.2)
BUN BLD-MCNC: <5 MG/DL (ref 9–23)
CALCIUM BLD-MCNC: 9.1 MG/DL (ref 8.7–10.4)
CHLORIDE SERPL-SCNC: 105 MMOL/L (ref 98–112)
CHOLEST SERPL-MCNC: 126 MG/DL (ref ?–200)
CO2 SERPL-SCNC: 28 MMOL/L (ref 21–32)
COMPLEXED PSA SERPL-MCNC: 0.22 NG/ML (ref ?–4)
CREAT BLD-MCNC: 0.71 MG/DL (ref 0.7–1.3)
DEPRECATED RDW RBC AUTO: 37.6 FL (ref 35.1–46.3)
EGFRCR SERPLBLD CKD-EPI 2021: 119 ML/MIN/1.73M2 (ref 60–?)
EOSINOPHIL # BLD AUTO: 0.27 X10(3) UL (ref 0–0.7)
EOSINOPHIL NFR BLD AUTO: 4.9 %
ERYTHROCYTE [DISTWIDTH] IN BLOOD BY AUTOMATED COUNT: 19.1 % (ref 11–15)
EST. AVERAGE GLUCOSE BLD GHB EST-MCNC: 177 MG/DL (ref 68–126)
FASTING PATIENT LIPID ANSWER: YES
FASTING STATUS PATIENT QL REPORTED: YES
GLOBULIN PLAS-MCNC: 3.9 G/DL (ref 2–3.5)
GLUCOSE BLD-MCNC: 203 MG/DL (ref 70–99)
HBA1C MFR BLD: 7.8 % (ref ?–5.7)
HCT VFR BLD AUTO: 37.3 % (ref 39–53)
HDLC SERPL-MCNC: 13 MG/DL (ref 40–59)
HGB BLD-MCNC: 12.5 G/DL (ref 13–17.5)
IMM GRANULOCYTES # BLD AUTO: 0.02 X10(3) UL (ref 0–1)
IMM GRANULOCYTES NFR BLD: 0.4 %
LDLC SERPL CALC-MCNC: 84 MG/DL (ref ?–100)
LYMPHOCYTES # BLD AUTO: 2.31 X10(3) UL (ref 1–4)
LYMPHOCYTES NFR BLD AUTO: 42.2 %
MCH RBC QN AUTO: 20.9 PG (ref 26–34)
MCHC RBC AUTO-ENTMCNC: 33.5 G/DL (ref 31–37)
MCV RBC AUTO: 62.5 FL (ref 80–100)
MONOCYTES # BLD AUTO: 0.54 X10(3) UL (ref 0.1–1)
MONOCYTES NFR BLD AUTO: 9.9 %
NEUTROPHILS # BLD AUTO: 2.29 X10 (3) UL (ref 1.5–7.7)
NEUTROPHILS # BLD AUTO: 2.29 X10(3) UL (ref 1.5–7.7)
NEUTROPHILS NFR BLD AUTO: 41.9 %
NONHDLC SERPL-MCNC: 113 MG/DL (ref ?–130)
P AXIS: 37 DEGREES
P-R INTERVAL: 166 MS
PLATELET # BLD AUTO: 148 10(3)UL (ref 150–450)
PLATELETS.RETICULATED NFR BLD AUTO: 7.9 % (ref 0–7)
POTASSIUM SERPL-SCNC: 3.6 MMOL/L (ref 3.5–5.1)
PROT SERPL-MCNC: 8.1 G/DL (ref 5.7–8.2)
Q-T INTERVAL: 374 MS
QRS DURATION: 88 MS
QTC CALCULATION (BEZET): 452 MS
R AXIS: 27 DEGREES
RBC # BLD AUTO: 5.97 X10(6)UL (ref 4.3–5.7)
SODIUM SERPL-SCNC: 142 MMOL/L (ref 136–145)
T AXIS: 44 DEGREES
TRIGL SERPL-MCNC: 163 MG/DL (ref 30–149)
TSI SER-ACNC: 3.55 UIU/ML (ref 0.55–4.78)
VENTRICULAR RATE: 88 BPM
VIT D+METAB SERPL-MCNC: 9.9 NG/ML (ref 30–100)
VLDLC SERPL CALC-MCNC: 26 MG/DL (ref 0–30)
WBC # BLD AUTO: 5.5 X10(3) UL (ref 4–11)

## 2025-06-12 PROCEDURE — 84443 ASSAY THYROID STIM HORMONE: CPT

## 2025-06-12 PROCEDURE — 80061 LIPID PANEL: CPT

## 2025-06-12 PROCEDURE — 83036 HEMOGLOBIN GLYCOSYLATED A1C: CPT

## 2025-06-12 PROCEDURE — 36415 COLL VENOUS BLD VENIPUNCTURE: CPT

## 2025-06-12 PROCEDURE — 93005 ELECTROCARDIOGRAM TRACING: CPT

## 2025-06-12 PROCEDURE — 93010 ELECTROCARDIOGRAM REPORT: CPT | Performed by: INTERNAL MEDICINE

## 2025-06-12 PROCEDURE — 80053 COMPREHEN METABOLIC PANEL: CPT

## 2025-06-12 PROCEDURE — 82306 VITAMIN D 25 HYDROXY: CPT

## 2025-06-12 PROCEDURE — 85025 COMPLETE CBC W/AUTO DIFF WBC: CPT

## 2025-06-12 RX ORDER — SILDENAFIL 100 MG/1
100 TABLET, FILM COATED ORAL
Qty: 90 TABLET | Refills: 11 | Status: SHIPPED | OUTPATIENT
Start: 2025-06-12

## 2025-06-12 NOTE — PROGRESS NOTES
OFFICE NOTE       The following individual(s) verbally consented to be recorded using ambient AI listening technology and understand that they can each withdraw their consent to this listening technology at any point by asking the clinician to turn off or pause the recording:    Patient name: Rodolfo Inman  Additional names:            Patient ID: Rodolfo Inman is a 39 year old male.  Today's Date: 06/12/25  Chief Complaint: Physical    HPI  History of Present Illness  Rodolfo Inman is a 39 year old male who presents for an annual physical exam and to establish care.    He has not had a physical in a while and mentions a recent leg infection for which he received blood work, revealing high blood sugar and abnormal liver numbers.    He has a family history of diabetes, with his maternal grandfather having diabetes and losing a foot and a toe. He is unsure of the type. His father has thalassemia trait, which he believes he has as well, affecting his red blood cell size. His father recently had a low platelet count leading to a splenectomy and is currently on Ozempic. There is no known family history of diabetes on his father's side.    He suspects he has sleep apnea, noting frequent nighttime awakenings and being told he stops breathing during sleep. He has not experienced gasping for air but reports increased nighttime awakenings over the past year. No wheezing noted.    He smokes about a pack a day since 2017, with a brief cessation period of six months, attributing the restart to stress from a divorce.    He is concerned about his weight and lifestyle, noting a lack of regular exercise despite a history of playing sports, including hockey through college. He feels embarrassed about returning to the gym without losing some weight first.    He reports issues with sexual function and is currently using Viagra from [employer], which he finds expensive. He is interested in more affordable options  and mentions concerns about his testosterone levels, though he does not experience morning erections frequently. No chest pain or palpitations.       Vitals:    06/12/25 1112   BP: 136/83   Pulse: 96   Weight: 245 lb (111.1 kg)   Height: 5' 6.6\" (1.692 m)     body mass index is 38.83 kg/m².  BP Readings from Last 3 Encounters:   06/12/25 136/83   05/12/25 143/84   05/09/25 160/84     The ASCVD Risk score (Mireille RYAN, et al., 2019) failed to calculate for the following reasons:    Cannot find a previous HDL lab    Cannot find a previous total cholesterol lab  Results  LABS  CBC: Hemoglobin low, Platelets low, Red Blood Cells low, Hematocrit low (05/12/2025)    DIAGNOSTIC  EKG: Sinus tachycardia (11/30/2011)       Medications reviewed:  Current Medications[1]      Assessment & Plan    1. Annual physical exam (Primary)  -     CT CALCIUM SCORING; Future; Expected date: 06/12/2025  -     EKG 12 Lead; Future; Expected date: 06/12/2025  -     PSA Total, Screen; Future; Expected date: 06/12/2025  -     Lipid Panel; Future; Expected date: 06/12/2025  -     TSH W Reflex To Free T4; Future; Expected date: 06/12/2025  -     Hemoglobin A1C; Future; Expected date: 06/12/2025  -     Comp Metabolic Panel (14); Future; Expected date: 06/12/2025  -     CBC With Differential With Platelet; Future; Expected date: 06/12/2025  -     Vitamin D; Future; Expected date: 06/12/2025  -     H PCP or Registry Removal Request  2. Immunization due  -     Prevnar 20 (PCV20) [47334]  3. Class 2 obesity  4. Encounter for screening for coronary artery disease  -     CT CALCIUM SCORING; Future; Expected date: 06/12/2025  5. Family history of early CAD  -     EKG 12 Lead; Future; Expected date: 06/12/2025  6. Screening for prostate cancer  -     PSA Total, Screen; Future; Expected date: 06/12/2025  7. Vitamin D deficiency  -     Vitamin D; Future; Expected date: 06/12/2025  8. Chondromalacia of knee, unspecified laterality  9. Thalassemia trait  10.  ALIA (obstructive sleep apnea)  -     Home Sleep Apnea Test (Adult pt only) - Sleep consult required for Medicare pts  -     General sleep study; Future; Expected date: 07/12/2025  -     Pulmonary Referral - In Network  11. Encounter for tobacco use cessation counseling  -     Tobacco Cessation Discussion  12. Other male erectile dysfunction  -     Sildenafil Citrate; Take 1 tablet (100 mg total) by mouth daily as needed for Erectile Dysfunction.  Dispense: 90 tablet; Refill: 11    Assessment & Plan  Annual Physical Examination  Family history of diabetes and thalassemia trait. Recent labs show hyperglycemia and potential anemia.  - Order lipid panel, TSH, A1c, CMP, CBC, vitamin D level.  - Order baseline EKG.  - Order PSA for prostate cancer screening.  - Order calcium CT scan score of the heart.    Sleep Apnea  Symptoms suggestive of sleep apnea. Home sleep study planned to confirm diagnosis and assess severity.  - Order home sleep study.  - Refer to pulmonologist.    Obesity  Aims to lose 12 pounds in three months. Discussed potential use of weight loss medications pending insurance approval and sleep study results.  - Encourage lifestyle changes including calorie counting, increasing physical activity, and tracking steps.  - Discuss potential use of weight loss medications pending insurance approval and sleep study results.    Hypertension  Blood pressure mildly elevated at 132/82. Weight loss and smoking cessation anticipated to reduce blood pressure.  - Encourage weight loss and smoking cessation.    Erectile Dysfunction  Currently uses Viagra from [employer]. Provided cost-effective option with sildenafil prescription.  - Prescribe sildenafil 100 mg with GoodRx coupon.    Thalassemia Trait  Inherited condition affecting red blood cell size, contributing to anemia.    General Health Maintenance  Advised on lifestyle changes to improve overall health and prevent future issues.  - Encourage smoking cessation.  -  Advise on regular physical activity and healthy eating habits.    Follow-up  Advised to follow up in three months to assess progress on lifestyle changes and weight loss.  - Schedule follow-up appointment for the week of September 8th.  - Discuss progress on lifestyle changes and potential use of Zepbound.       Follow Up: As needed/if symptoms worsen or Return in about 3 months (around 9/12/2025) for Life style changes and discussion about Zepbound ..     Objective/ Results:   Physical Exam  Constitutional:       Appearance: He is well-developed. He is obese.   Cardiovascular:      Rate and Rhythm: Normal rate and regular rhythm.      Heart sounds: Normal heart sounds.   Pulmonary:      Effort: Pulmonary effort is normal.      Breath sounds: Normal breath sounds.   Abdominal:      General: Bowel sounds are normal.      Palpations: Abdomen is soft.   Skin:     General: Skin is warm and dry.   Neurological:      Mental Status: He is alert and oriented to person, place, and time.      Deep Tendon Reflexes: Reflexes are normal and symmetric.        Physical Exam  VITALS: BP- 132/82  CHEST: Lungs clear to auscultation, no wheezing.     Reviewed:    Patient Active Problem List    Diagnosis    Class 2 obesity    Chondromalacia of knee    ALIA (obstructive sleep apnea)    Other male erectile dysfunction    Thalassemia trait      Allergies[2]   Short Social Hx on File[3]   Review of Systems   Constitutional:  Positive for fatigue.   Respiratory: Negative.     Cardiovascular: Negative.    Gastrointestinal: Negative.    Skin: Negative.    Neurological: Negative.        All other systems negative unless otherwise stated in ROS or HPI above.       Steven Walter MD  Internal Medicine       Call office with any questions or seek emergency care if necessary.   Patient understands and agrees to follow directions and advice.      ----------------------------------------- PATIENT INSTRUCTIONS-----------------------------------------      Patient Instructions     Lifestyle Changes Recommendations:   Nutritional Goals Reviewed and Discussed:   Limit Carbohydrates to 100gm per day, Eat 100-200 calories within 1 hour of awkaing up, Eat 3-4 cups of fresh fruits or vegetables daily    Behavior Modification Reviewed and Discussed:  Eat breakfast, Eat 3 meals per day, Plan meals in advance (if unable to cook, meal prep service such as [id number]) High protein, Low simple carbs. Read nutrition labels track calories and Macros with [employer] or [employer] tayler (thus daily food journal), No drinking 30mintutes before or after meals, utilize portion control strategies to reduce caloric intake, Identify triggers for eating and manage cues, and Eat Slowly and take 20-30 minutes to complete each meal.    Goal for Next Month:  Keep Food log: At first track current daily caloric intake and limit current caloric consumption by 500 to 1,000 calories daily OR start with caloric restriction of less than 1,800 calories daily.   Increase Cardiac/cardio exercise at least 30minutes a day/ 180 minutes a week, ideal Goal is 1hr a day (5 days a week) would prefer if enrolls in fitness classes or  at least 1x a week. (If possible to work out in the morning is proven to increase natural Dopamine, Serotonin, Endorphin, levels (Feel good and energy hormones) and reduce cortisol  (stress hormone levels).   Drink 48-64 ounces of non-caloric beverages per day. No fruit juices or regular soda.  Increase activity- upper body exercises in addition to cardio and, aim to walk 10minutes per day after dinner  Increase fruit and vegetable servings to 5-6 per day.   6. Food recommendation for Breakfast: Steel cut oatmeal:  1cup Steel cut oatmeal and 2cups unsweetened almond milk. And cinnamon (let it ,overnight in a bowl), and portion out 4 servings (separate containers/jars), then daily add one triple zero okios greek yogurt, 1 medium banana and however many berries your  heart desires  All berries are good, (blueberry, raspberry, strawberries, blackberries).   -avoid high sugar fruits (pineapple, mangos, melons, banana)- high  7. Plant based protein: Ascent Plant Based Protein Powder - Non Dairy Vegan Protein, Zero Artificial Ingredients, Soy & Gluten Free, No Added Sugar, 4g BCAA, 2g Leucine - Chocolate, 18 Servings          The 21st Century Cures Act makes medical notes available to patients in the interest of transparency.  However, please be advised that this is a medical document.  It is intended as a peer to peer communication.  It is written in medical language and may contain abbreviations or verbiage that are technical and unfamiliar.  It may appear blunt or direct.  Medical documents are intended to carry relevant information, facts as evident, and the clinical opinion of the practitioner.          [1]   Current Outpatient Medications   Medication Sig Dispense Refill    Sildenafil Citrate 100 MG Oral Tab Take 1 tablet (100 mg total) by mouth daily as needed for Erectile Dysfunction. 90 tablet 11    mupirocin 2 % External Ointment Apply to affected area BID for 5-7 days (Patient not taking: Reported on 6/12/2025) 22 g 0   [2] No Known Allergies  [3]   Social History  Socioeconomic History    Marital status:    Tobacco Use    Smoking status: Every Day     Current packs/day: 1.00     Average packs/day: 1 pack/day for 8.4 years (8.4 ttl pk-yrs)     Types: Cigarettes     Start date: 1/6/2017     Passive exposure: Current    Smokeless tobacco: Current   Vaping Use    Vaping status: Never Used   Substance and Sexual Activity    Alcohol use: Yes     Alcohol/week: 23.0 standard drinks of alcohol     Types: 23 Cans of beer per week     Comment: 23 beers, weekly.    Drug use: No   Other Topics Concern    Caffeine Concern Yes     Comment: Soda

## 2025-06-12 NOTE — PATIENT INSTRUCTIONS
Lifestyle Changes Recommendations:   Nutritional Goals Reviewed and Discussed:   Limit Carbohydrates to 100gm per day, Eat 100-200 calories within 1 hour of awkaing up, Eat 3-4 cups of fresh fruits or vegetables daily    Behavior Modification Reviewed and Discussed:  Eat breakfast, Eat 3 meals per day, Plan meals in advance (if unable to cook, meal prep service such as [id number]) High protein, Low simple carbs. Read nutrition labels track calories and Macros with [employer] or [employer] tayler (thus daily food journal), No drinking 30mintutes before or after meals, utilize portion control strategies to reduce caloric intake, Identify triggers for eating and manage cues, and Eat Slowly and take 20-30 minutes to complete each meal.    Goal for Next Month:  Keep Food log: At first track current daily caloric intake and limit current caloric consumption by 500 to 1,000 calories daily OR start with caloric restriction of less than 1,800 calories daily.   Increase Cardiac/cardio exercise at least 30minutes a day/ 180 minutes a week, ideal Goal is 1hr a day (5 days a week) would prefer if enrolls in fitness classes or  at least 1x a week. (If possible to work out in the morning is proven to increase natural Dopamine, Serotonin, Endorphin, levels (Feel good and energy hormones) and reduce cortisol  (stress hormone levels).   Drink 48-64 ounces of non-caloric beverages per day. No fruit juices or regular soda.  Increase activity- upper body exercises in addition to cardio and, aim to walk 10minutes per day after dinner  Increase fruit and vegetable servings to 5-6 per day.   6. Food recommendation for Breakfast: Steel cut oatmeal:  1cup Steel cut oatmeal and 2cups unsweetened almond milk. And cinnamon (let it ,overnight in a bowl), and portion out 4 servings (separate containers/jars), then daily add one triple zero okios greek yogurt, 1 medium banana and however many berries your heart desires  All berries are  good, (blueberry, raspberry, strawberries, blackberries).   -avoid high sugar fruits (pineapple, mangos, melons, banana)- high  7. Plant based protein: Ascent Plant Based Protein Powder - Non Dairy Vegan Protein, Zero Artificial Ingredients, Soy & Gluten Free, No Added Sugar, 4g BCAA, 2g Leucine - Chocolate, 18 Servings

## 2025-06-13 ENCOUNTER — PATIENT OUTREACH (OUTPATIENT)
Dept: CASE MANAGEMENT | Age: 40
End: 2025-06-13

## 2025-06-13 NOTE — PROCEDURES
Received order requesting to update Primary Care Physician (PCP) to Dr. Steven Walter is Approved and finalized on June 13, 2025.    Removed Nigel Landaverde MD as the patient's Primary Care Physician

## 2025-06-15 NOTE — PROGRESS NOTES
Please relay to patient (new dx diabetes needs to follow up for treatment recs:        Hello There!     Dr. Walter here, I have reviewed your labs here are your recommendations:    EKG Results: shows Normal Sinus rhythm. It is a good baseline to have documented should there be any medical changes for future reference     # Lipids/cholesterol: Your lipids are well controlled at this time, slight abnormalities are ok and are diet related. Heart disease risk scoring, ie ASCVD, typically starts around age 40 and increases with age. I will address this with you if the ASCVD reaches greater than 5% to discuss preventive options. Please continue with exercise and healthy diet, such as the mediterranean diet.     The ASCVD Risk score (Mireille RYAN, et al., 2019) failed to calculate for the following reasons:    The valid HDL cholesterol range is 20 to 100 mg/dL    The valid total cholesterol range is 130 to 320 mg/dL    # Diabetes/A1C: Your A1C Last A1c value was 7.8% done 6/12/2025.   which shows  diabetes. Please call the office or schedule online to schedule a follow up video or office visit to discuss treatment options. We will also try to reach out to schedule if you have not already done so.     # TSH: Your thyroid (TSH) function is normal.     # CMP: Your comprehensive metabolic panel shows overall stable functioning kidneys (creatinine, GFR), liver (AST, ALT, Bilirubin), and electrolytes (sodium, potassium, calcium). Slight variations in other values such as BUN/Creat, Serum Osm, anion gap, chloride, etc are not of clinical value at this time.     # CBC: Your complete blood count shows overall stable red blood cells, white blood cells, platelets (help you stop bleeding), and hematocrit (thickness of blood),  Slight variations in other values such as RDW/sw, MCH are not of clinical value at this time.       # Vitamins: Your vitamin D level is Vitamin D Deficiency (<12ng/mL) please start 50,000 International units (one  pill weekly) for 6months, I will send a prescription but might be  cheaper to purchase from [facility] or your local pharmacy.   will recheck Vitamin D level 6 months from now, and schedule a follow up in 6 months after checking Vitamin D to see if we need to dose adjust further and if any other medical issues need to be addressed at that time.       # Prostate Cancer Screening: Your Prostate Cancer Screening (PSA) Is normal, will repeat next year or sooner if you develop urinary frequency symptoms at night    -Dr. Walter

## 2025-06-15 NOTE — PROGRESS NOTES
Please relay to patient (new dx diabetes needs to follow up for treatment recs:        Hello There!     Dr. Walter here, I have reviewed your labs here are your recommendations:    EKG Results: shows Normal Sinus rhythm. It is a good baseline to have documented should there be any medical changes for future reference     # Lipids/cholesterol: Your lipids are well controlled at this time, slight abnormalities are ok and are diet related. Heart disease risk scoring, ie ASCVD, typically starts around age 40 and increases with age. I will address this with you if the ASCVD reaches greater than 5% to discuss preventive options. Please continue with exercise and healthy diet, such as the mediterranean diet.     The ASCVD Risk score (Mireille RYAN, et al., 2019) failed to calculate for the following reasons:    The valid HDL cholesterol range is 20 to 100 mg/dL    The valid total cholesterol range is 130 to 320 mg/dL    # Diabetes/A1C: Your A1C Last A1c value was 7.8% done 6/12/2025.   which shows  diabetes. Please call the office or schedule online to schedule a follow up video or office visit to discuss treatment options. We will also try to reach out to schedule if you have not already done so.     # TSH: Your thyroid (TSH) function is normal.     # CMP: Your comprehensive metabolic panel shows overall stable functioning kidneys (creatinine, GFR), liver (AST, ALT, Bilirubin), and electrolytes (sodium, potassium, calcium). Slight variations in other values such as BUN/Creat, Serum Osm, anion gap, chloride, etc are not of clinical value at this time.     # CBC: Your complete blood count shows overall stable red blood cells, white blood cells, platelets (help you stop bleeding), and hematocrit (thickness of blood),  Slight variations in other values such as RDW/sw, MCH are not of clinical value at this time.       # Vitamins: Your vitamin D level is Vitamin D Deficiency (<12ng/mL) please start 50,000 International units (one  pill weekly) for 6months, I will send a prescription but might be  cheaper to purchase from [employer] or your local pharmacy.   will recheck Vitamin D level 6 months from now, and schedule a follow up in 6 months after checking Vitamin D to see if we need to dose adjust further and if any other medical issues need to be addressed at that time.       # Prostate Cancer Screening: Your Prostate Cancer Screening (PSA) Is normal, will repeat next year or sooner if you develop urinary frequency symptoms at night    -Dr. Walter

## 2025-06-19 ENCOUNTER — HOSPITAL ENCOUNTER (OUTPATIENT)
Dept: CT IMAGING | Age: 40
Discharge: HOME | End: 2025-06-19
Attending: STUDENT IN AN ORGANIZED HEALTH CARE EDUCATION/TRAINING PROGRAM

## 2025-06-19 DIAGNOSIS — Z00.00 ANNUAL PHYSICAL EXAM: ICD-10-CM

## 2025-06-19 DIAGNOSIS — Z13.6 ENCOUNTER FOR SCREENING FOR CORONARY ARTERY DISEASE: ICD-10-CM

## 2025-06-20 ENCOUNTER — TELEMEDICINE (OUTPATIENT)
Dept: INTERNAL MEDICINE CLINIC | Facility: CLINIC | Age: 40
End: 2025-06-20
Payer: COMMERCIAL

## 2025-06-20 DIAGNOSIS — E78.5 HYPERLIPIDEMIA ASSOCIATED WITH TYPE 2 DIABETES MELLITUS (HCC): ICD-10-CM

## 2025-06-20 DIAGNOSIS — E11.69 HYPERLIPIDEMIA ASSOCIATED WITH TYPE 2 DIABETES MELLITUS (HCC): ICD-10-CM

## 2025-06-20 DIAGNOSIS — E11.65 TYPE 2 DIABETES MELLITUS WITH HYPERGLYCEMIA, WITHOUT LONG-TERM CURRENT USE OF INSULIN (HCC): Primary | ICD-10-CM

## 2025-06-20 RX ORDER — TIRZEPATIDE 5 MG/.5ML
5 INJECTION, SOLUTION SUBCUTANEOUS WEEKLY
Qty: 2 ML | Refills: 0 | Status: SHIPPED | OUTPATIENT
Start: 2025-07-11 | End: 2025-08-08

## 2025-06-20 RX ORDER — ROSUVASTATIN CALCIUM 10 MG/1
10 TABLET, COATED ORAL NIGHTLY
Qty: 90 TABLET | Refills: 3 | Status: SHIPPED | OUTPATIENT
Start: 2025-06-20

## 2025-06-20 RX ORDER — TIRZEPATIDE 7.5 MG/.5ML
7.5 INJECTION, SOLUTION SUBCUTANEOUS WEEKLY
Qty: 2 ML | Refills: 1 | Status: SHIPPED | OUTPATIENT
Start: 2025-08-01 | End: 2025-08-29

## 2025-06-20 RX ORDER — METFORMIN HYDROCHLORIDE 500 MG/1
500 TABLET, EXTENDED RELEASE ORAL 2 TIMES DAILY WITH MEALS
Qty: 180 TABLET | Refills: 1 | Status: SHIPPED | OUTPATIENT
Start: 2025-06-20 | End: 2025-12-17

## 2025-06-20 RX ORDER — TIRZEPATIDE 2.5 MG/.5ML
2.5 INJECTION, SOLUTION SUBCUTANEOUS WEEKLY
Qty: 2 ML | Refills: 0 | Status: SHIPPED | OUTPATIENT
Start: 2025-06-20 | End: 2025-07-18

## 2025-06-20 NOTE — PROGRESS NOTES
OFFICE NOTE       The following individual(s) verbally consented to be recorded using ambient AI listening technology and understand that they can each withdraw their consent to this listening technology at any point by asking the clinician to turn off or pause the recording:    Patient name: Rodolfo Inman  Additional names:            Patient ID: Rodolfo Inman is a 39 year old male.  Today's Date: 06/20/25  Chief Complaint: No chief complaint on file.      History of Present Illness  Rodolfo Inman is a 39 year old male who presents for management of newly diagnosed diabetes.    He has been diagnosed with diabetes, indicated by a recent A1c level of 7.8%. He has no prior history of diabetes and is not currently on any diabetes medications.    His diet is not optimal, as he does not eat a lot but acknowledges poor dietary choices. He suspects his sugar intake might primarily come from drinking. He does not have a partner and confirms that his [facility].    He mentions having undergone a heart scan recently, though results are pending. He inquires about the use of Viagra, which he has been prescribed, and there are no issues with its use in the context of his diabetes.    There is no known family history of medullary thyroid cancer, which is relevant to his potential treatment options.       There were no vitals filed for this visit.  body mass index is unknown because there is no height or weight on file.  BP Readings from Last 3 Encounters:   06/12/25 136/83   05/12/25 143/84   05/09/25 160/84     The ASCVD Risk score (Mireille RYAN, et al., 2019) failed to calculate for the following reasons:    The valid HDL cholesterol range is 20 to 100 mg/dL    The valid total cholesterol range is 130 to 320 mg/dL  Results  LABS  HbA1c: 7.8% (06/20/2025)       Medications reviewed:  Current Medications[1]      Assessment & Plan    1. Type 2 diabetes mellitus  with hyperglycemia, without long-term current use of insulin (HCC) (Primary)  -     metFORMIN HCl ER; Take 1 tablet (500 mg total) by mouth 2 (two) times daily with meals.  Dispense: 180 tablet; Refill: 1  -     Mounjaro; Inject 7.5 mg into the skin once a week for 28 days.  Dispense: 2 mL; Refill: 1  -     Mounjaro; Inject 5 mg into the skin once a week for 28 days.  Dispense: 2 mL; Refill: 0  -     Mounjaro; Inject 2.5 mg into the skin once a week for 28 days.  Dispense: 2 mL; Refill: 0  -     Diabetes Navigator  -     Referral to [facility]  2. Hyperlipidemia associated with type 2 diabetes mellitus (HCC)  -     Rosuvastatin Calcium; Take 1 tablet (10 mg total) by mouth nightly. FOR CHOLESTEROL.  Dispense: 90 tablet; Refill: 3    Assessment & Plan  Diabetes Mellitus  New diabetes diagnosis with A1c 7.8%. Emphasized glycemic control to prevent complications. Discussed metformin and Mounjaro for treatment. Highlighted dietary changes and alcohol reduction. Statin therapy indicated for atherosclerotic risk.  - Prescribe metformin 500 mg orally twice daily.  - Initiate Mounjaro at 2.5 mg subcutaneously once weekly for four weeks, then increase to 5 mg weekly for four weeks, followed by 7.5 mg weekly.  - Refer to diabetes educators for additional teaching on nutrition, exercise, and medication use.  - Schedule follow-up on September 17, 2025, to assess diabetes control and weight.  - Schedule annual diabetic eye exam to monitor for retinopathy.  - Prescribe rosuvastatin 10 mg orally daily to reduce cardiovascular risk.    Follow-up  Awaiting heart scan results.  - Follow up on September 17, 2025, to evaluate diabetes management and weight.  - Send Memopal message with heart scan results when available.       Follow Up: As needed/if symptoms worsen or No follow-ups on file..     Objective/ Results:   Physical Exam  Vitals reviewed.   Constitutional:       Appearance: Normal appearance.   Neurological:       Mental Status: He is alert.      Limited due to televideo visit  Physical Exam         Reviewed:    Patient Active Problem List    Diagnosis    Type 2 diabetes mellitus with hyperglycemia, without long-term current use of insulin ([facility])    Class 2 obesity    Chondromalacia of knee    ALIA (obstructive sleep apnea)    Other male erectile dysfunction    Thalassemia trait      Allergies[2]   Short Social Hx on File[3]   Review of Systems   Constitutional: Negative.    Respiratory: Negative.     Cardiovascular: Negative.    Gastrointestinal: Negative.    Skin: Negative.    Neurological: Negative.        All other systems negative unless otherwise stated in ROS or HPI above.       Steven Walter MD  Internal Medicine       Call office with any questions or seek emergency care if necessary.   Patient understands and agrees to follow directions and advice.      ----------------------------------------- PATIENT INSTRUCTIONS-----------------------------------------     There are no Patient Instructions on file for this visit.        The 21st Century Cures Act makes medical notes available to patients in the interest of transparency.  However, please be advised that this is a medical document.  It is intended as a peer to peer communication.  It is written in medical language and may contain abbreviations or verbiage that are technical and unfamiliar.  It may appear blunt or direct.  Medical documents are intended to carry relevant information, facts as evident, and the clinical opinion of the practitioner.          [1]   Current Outpatient Medications   Medication Sig Dispense Refill    rosuvastatin 10 MG Oral Tab Take 1 tablet (10 mg total) by mouth nightly. FOR CHOLESTEROL. 90 tablet 3    metFORMIN  MG Oral Tablet 24 Hr Take 1 tablet (500 mg total) by mouth 2 (two) times daily with meals. 180 tablet 1    [START ON 8/1/2025] Tirzepatide (MOUNJARO) 7.5 MG/0.5ML Subcutaneous Solution Auto-injector Inject 7.5 mg into  the skin once a week for 28 days. 2 mL 1    [START ON 7/11/2025] Tirzepatide (MOUNJARO) 5 MG/0.5ML Subcutaneous Solution Auto-injector Inject 5 mg into the skin once a week for 28 days. 2 mL 0    Tirzepatide (MOUNJARO) 2.5 MG/0.5ML Subcutaneous Solution Auto-injector Inject 2.5 mg into the skin once a week for 28 days. 2 mL 0    Sildenafil Citrate 100 MG Oral Tab Take 1 tablet (100 mg total) by mouth daily as needed for Erectile Dysfunction. 90 tablet 11   [2] No Known Allergies  [3]   Social History  Socioeconomic History    Marital status:    Tobacco Use    Smoking status: Every Day     Current packs/day: 1.00     Average packs/day: 1 pack/day for 8.5 years (8.5 ttl pk-yrs)     Types: Cigarettes     Start date: 1/6/2017     Passive exposure: Current    Smokeless tobacco: Current   Vaping Use    Vaping status: Never Used   Substance and Sexual Activity    Alcohol use: Yes     Alcohol/week: 23.0 standard drinks of alcohol     Types: 23 Cans of beer per week     Comment: 23 beers, weekly.    Drug use: No   Other Topics Concern    Caffeine Concern Yes     Comment: Soda

## 2025-06-23 ENCOUNTER — TELEPHONE (OUTPATIENT)
Dept: ENDOCRINOLOGY | Facility: HOSPITAL | Age: 40
End: 2025-06-23

## 2025-06-23 NOTE — TELEPHONE ENCOUNTER
Received order from  for  DSMT Initial (1 hr ind/9 hr group) Ind followup DSMT (2hrs) Medical nutrition therapy (MNT). Called patient and LVM to schedule appointment. Provided office phone number and awaiting patient to call back.

## 2025-06-24 NOTE — PROGRESS NOTES
Please relay to patient if not read:       Hello There!    Your CT Calcium score over-read, which shows the other organs in your chest cavity and is included with your recently competed heart scan is overall bert just fatty liver and a small liver cyst.     Your Calcium score is 0 indicating no Atherosclerosis (Coronary Artery Disease) fatty/plaque buildup in arteries supplying your heart.     Below is your 10-year risk score which indicates the potential chance that you could have a heart attack, stroke or death related to cholesterol/heart disease.     Your Calcium CT score is 0 and ASCVD risk score is below 5%, thus No statin medication needed.     If you are having any episodes of chest pain, palpitations or shortness of breath on exertion if you have not already scheduled a follow up please do and we can discuss further cardiac workup if warranted (if we have not done so already).     The ASCVD Risk score (Mireille DK, et al., 2019) failed to calculate for the following reasons:    The valid HDL cholesterol range is 20 to 100 mg/dL    The valid total cholesterol range is 130 to 320 mg/dL    I recommend to eat a more balanced mediterranean diet (eat more vegetables and fruits) more omega 3 fatty acids, lean protein (chicken, turkey, seafood), less process/fried fatty foods, less red met, and mixed aerobic exercise 30 minutes a day and intermittent strength training.      We will repeat your Calcium CT testing in 3 years    Let me know if you have any questions,you can schedule a follow up and gladly discuss.  -Dr. Walter

## 2025-06-26 ENCOUNTER — TELEPHONE (OUTPATIENT)
Dept: INTERNAL MEDICINE CLINIC | Facility: CLINIC | Age: 40
End: 2025-06-26

## 2025-06-26 NOTE — TELEPHONE ENCOUNTER
Plan does not cover this medication.   Please call plan at [phone] to initiate prior authorization or call/fax pharmacy to change medication.  Patient ID # is [id number].       Tirzepatide (MOUNJARO) 2.5 MG/0.5ML Subcutaneous Solution Auto-injector, Inject 2.5 mg into the skin once a week for 28 days., Disp: 2 mL, Rfl: 0

## 2025-08-15 ENCOUNTER — OFFICE VISIT (OUTPATIENT)
Dept: SLEEP CENTER | Age: 40
End: 2025-08-15
Attending: STUDENT IN AN ORGANIZED HEALTH CARE EDUCATION/TRAINING PROGRAM

## 2025-08-15 ENCOUNTER — TELEPHONE (OUTPATIENT)
Dept: SLEEP CENTER | Age: 40
End: 2025-08-15

## 2025-08-15 DIAGNOSIS — G47.33 OSA (OBSTRUCTIVE SLEEP APNEA): ICD-10-CM

## 2025-08-19 ENCOUNTER — ORDER TRANSCRIPTION (OUTPATIENT)
Dept: SLEEP CENTER | Age: 40
End: 2025-08-19

## (undated) NOTE — ED AVS SNAPSHOT
Catracho Servin   MRN: [id number]    Department:  [facility] Emergency Department   Date of Visit:  11/14/2017           Disclosure     Insurance plans vary and the physician(s) referred by the ER may not be covered by your plan.  Please cont CARE PHYSICIAN AT ONCE OR RETURN IMMEDIATELY TO THE EMERGENCY DEPARTMENT. If you have been prescribed any medication(s), please fill your prescription right away and begin taking the medication(s) as directed.   If you believe that any of the medications

## (undated) NOTE — LETTER
12/14/18        [address] [place] [address]      Dear Rosy Rivera,    [facility] records indicate that you have outstanding lab work and or testing that was ordered for you and has not yet been completed:  Orders Placed This Encount

## (undated) NOTE — MR AVS SNAPSHOT
[facility] [place] [address] [place]  [id number] [phone]               Thank you for choosing us for your health care visit with Jeanette Oquendo MD.  We are glad to serve you and happy to provide you with this summary of y 127/82 mmHg 84 98 °F (36.7 °C) (Oral) 5' 8\" (1.727 m) 272 lb (123.378 kg) 41.37 kg/m2         Current Medications      Notice  As of 3/6/2017  9:07 AM    You have not been prescribed any medications.             MyChart     Visit MyChart  You can access y Get your heart pumping – brisk walking, biking, swimming Even 10 minute increments are effective and add up over the week   2 ½ hours per week – spread out over time Use a анна to keep you motivated   Don’t forget strength training with weights and resist

## (undated) NOTE — LETTER
03/14/19        [address] [place] [address]      Dear Erick Mora,    [facility] records indicate that you have outstanding lab work and or testing that was ordered for you and has not yet been completed:  Orders Placed This Encount